# Patient Record
Sex: OTHER/UNKNOWN | URBAN - NONMETROPOLITAN AREA
[De-identification: names, ages, dates, MRNs, and addresses within clinical notes are randomized per-mention and may not be internally consistent; named-entity substitution may affect disease eponyms.]

---

## 2024-03-08 ENCOUNTER — TELEPHONE (OUTPATIENT)
Dept: CARDIOLOGY | Facility: CLINIC | Age: 69
End: 2024-03-08
Payer: MEDICARE

## 2024-03-08 ENCOUNTER — TRANSCRIBE ORDERS (OUTPATIENT)
Dept: CARDIOLOGY | Facility: CLINIC | Age: 69
End: 2024-03-08
Payer: MEDICARE

## 2024-03-08 DIAGNOSIS — I38 HEART VALVE CALCIFICATION: ICD-10-CM

## 2024-03-08 NOTE — TELEPHONE ENCOUNTER
SPOKE WITH PT, PT TO CALL BACK AND SCHEDULE REFERRAL, HUB CAN SCHEDULE EARLIEST AVAILABLE APPOINTMENT

## 2024-04-08 ENCOUNTER — OFFICE VISIT (OUTPATIENT)
Dept: CARDIOLOGY | Facility: CLINIC | Age: 69
End: 2024-04-08
Payer: MEDICARE

## 2024-04-08 VITALS
SYSTOLIC BLOOD PRESSURE: 128 MMHG | HEIGHT: 71 IN | DIASTOLIC BLOOD PRESSURE: 66 MMHG | HEART RATE: 102 BPM | RESPIRATION RATE: 16 BRPM | WEIGHT: 231 LBS | BODY MASS INDEX: 32.34 KG/M2 | OXYGEN SATURATION: 99 %

## 2024-04-08 DIAGNOSIS — I10 ESSENTIAL HYPERTENSION: ICD-10-CM

## 2024-04-08 DIAGNOSIS — E78.2 MIXED HYPERLIPIDEMIA: ICD-10-CM

## 2024-04-08 DIAGNOSIS — I25.10 CORONARY ARTERY CALCIFICATION SEEN ON CT SCAN: ICD-10-CM

## 2024-04-08 DIAGNOSIS — I51.7 LEFT ATRIAL ENLARGEMENT: ICD-10-CM

## 2024-04-08 PROCEDURE — 93000 ELECTROCARDIOGRAM COMPLETE: CPT | Performed by: INTERNAL MEDICINE

## 2024-04-08 PROCEDURE — 3078F DIAST BP <80 MM HG: CPT | Performed by: INTERNAL MEDICINE

## 2024-04-08 PROCEDURE — 1160F RVW MEDS BY RX/DR IN RCRD: CPT | Performed by: INTERNAL MEDICINE

## 2024-04-08 PROCEDURE — 99204 OFFICE O/P NEW MOD 45 MIN: CPT | Performed by: INTERNAL MEDICINE

## 2024-04-08 PROCEDURE — 1159F MED LIST DOCD IN RCRD: CPT | Performed by: INTERNAL MEDICINE

## 2024-04-08 PROCEDURE — 3074F SYST BP LT 130 MM HG: CPT | Performed by: INTERNAL MEDICINE

## 2024-04-08 RX ORDER — FENOFIBRATE 160 MG/1
1 TABLET ORAL DAILY
COMMUNITY
Start: 2024-01-22

## 2024-04-08 RX ORDER — PANTOPRAZOLE SODIUM 20 MG/1
20 TABLET, DELAYED RELEASE ORAL DAILY
COMMUNITY

## 2024-04-08 RX ORDER — PRAVASTATIN SODIUM 20 MG
40 TABLET ORAL
Status: SHIPPED | COMMUNITY
Start: 2024-04-08

## 2024-04-08 RX ORDER — PRAVASTATIN SODIUM 20 MG
20 TABLET ORAL
COMMUNITY
Start: 2024-01-22 | End: 2024-04-08

## 2024-04-08 RX ORDER — FERROUS SULFATE 325(65) MG
1 TABLET ORAL DAILY
COMMUNITY
Start: 2024-01-24

## 2024-04-08 RX ORDER — LISINOPRIL 40 MG/1
1 TABLET ORAL DAILY
COMMUNITY
Start: 2024-02-01

## 2024-04-08 RX ORDER — ORAL SEMAGLUTIDE 7 MG/1
1 TABLET ORAL DAILY
COMMUNITY
Start: 2024-03-08

## 2024-04-08 RX ORDER — AMLODIPINE BESYLATE 5 MG/1
1 TABLET ORAL DAILY
COMMUNITY
Start: 2024-01-23

## 2024-04-08 RX ORDER — NIACIN 500 MG/1
500 TABLET, EXTENDED RELEASE ORAL DAILY
COMMUNITY
Start: 2024-01-22

## 2024-04-08 RX ORDER — GLIMEPIRIDE 2 MG/1
2 TABLET ORAL
COMMUNITY
Start: 2024-02-09

## 2024-04-08 NOTE — ASSESSMENT & PLAN NOTE
Severe coronary calcification seen on CT scan of the chest.  Patient diabetic.  Blood pressure and heart rate to goal.  Cholesterol not to goal at 97 mg/dL.  No clinical angina.  Shortness of breath with moderate daily activities, possibly related to deconditioning versus CAD.  EKG normal.  Discussed CT findings with patient extensively and all questions answered.  Plan:  Refer patient for a Lexiscan nuclear stress test as unable to walk due to knee problems  Uptitrate pravastatin 40 mg p.o. daily, and continue fenofibrate/nicotinic acid as taken  Monitor blood pressure at home

## 2024-04-08 NOTE — PATIENT INSTRUCTIONS
[facility]  [facility] CARDIOLOGY  [address] [place] [address]  Dept: [phone]  Dept Fax: [phone]    Date:   Patient: Woo Acosta    Blood Pressure Log  Provided By: Gregoroi Lang MD    Date Blood Pressure Heart Rate Comments   Tuesday April 9, 2024       Wednesday April 10, 2024       Thursday April 11, 2024       Friday April 12, 2024       Saturday April 13, 2024       Thee April 14, 2024       Monday April 15, 2024       Tuesday April 16, 2024       Wednesday April 17, 2024       Thursday April 18, 2024       Friday April 19, 2024       Saturday April 20, 2024       Thee April 21, 2024       Monday April 22, 2024       Tuesday April 23, 2024       Wednesday April 24, 2024       Thursday April 25, 2024       Friday April 26, 2024       Saturday April 27, 2024       Thee April 28, 2024       Monday April 29, 2024       Tuesday April 30, 2024       Wednesday May 1, 2024       Thursday May 2, 2024       Friday May 3, 2024       Saturday May 4, 2024       Thee May 5, 2024       Monday May 6, 2024       Tuesday May 7, 2024       Wednesday May 8, 2024       Thursday May 9, 2024       Friday May 10, 2024       Saturday May 11, 2024       Thee May 12, 2024       Monday May 13, 2024       Tuesday May 14, 2024       Wednesday May 15, 2024       Thursday May 16, 2024       Friday May 17, 2024       Saturday May 18, 2024       Thee May 19, 2024       Monday May 20, 2024       Tuesday May 21, 2024       Wednesday May 22, 2024       Thursday May 23, 2024       Friday May 24, 2024       Saturday May 25, 2024       Thee May 26, 2024       Monday May 27, 2024       Tuesday May 28, 2024       Wednesday May 29, 2024       Thursday May 30, 2024       Friday May 31, 2024       Saturday June 1, 2024       Thee June 2, 2024       Monday Arianna 3, 2024       Tuesday June 4, 2024       Wednesday June 5, 2024       Thursday June 6, 2024

## 2024-04-08 NOTE — ASSESSMENT & PLAN NOTE
Lipid abnormalities are improving with treatment    Plan:  Continue same medication/s without change.   and Plan dosage change to the following medication/s;  uptitrate pravastatin to 40 mg p.o. daily.      Discussed medication dosage, use, side effects, and goals of treatment in detail.    Counseled patient on lifestyle modifications to help control hyperlipidemia.   Cholesterol lowering dietary information shared with patient.  Advised patient to exercise for 150 minutes weekly. (30 minute brisk walk, 5 days a week for example)  Weight Loss encouraged  Patient counseled in regards to heart healthy, low fat/ low cholesterol/low sat diet, daily exercise for 30 minutes, low to moderate intensity, and weight loss.    Patient Treatment Goals:   LDL goal is less than 70    Followup in 6 months.

## 2024-04-08 NOTE — PROGRESS NOTES
[facility]  [facility] CARDIOLOGY  [address] [place] [address]  Dept: [phone]  Dept Fax: [phone]    Date: 2024  Patient: Woo Acosta  YOB: 1955    New Patient Office Note    Consult Reason:  Mr. Woo Acosta is a 68 y.o. male who presents to the clinic to establish care, seen for Coronary Artery Disease (Severe coronary calcifications seen on lung cancer screening CT).   Patient overall doing well.  With limitation to exercise on moderate daily activity, and of a steep hill.  Patient denies angina, orthopnea, PND, palpitations, lightheadedness, syncope or medications side-effects.    The following portions of the patient's history were reviewed and updated as appropriate: allergies, current medications, past family history, past medical history, past social history, past surgical history, and problem list.    Medications: No Known Allergies   Current Outpatient Medications   Medication Instructions    amLODIPine (NORVASC) 5 MG tablet 1 tablet, Oral, Daily    fenofibrate 160 MG tablet 1 tablet, Oral, Daily    ferrous sulfate 325 (65 FE) MG tablet 1 tablet, Oral, Daily    glimepiride (AMARYL) 2 mg, Oral, Every Morning Before Breakfast    lisinopril (PRINIVIL,ZESTRIL) 40 MG tablet 1 tablet, Oral, Daily    metFORMIN (GLUCOPHAGE) 1,000 mg, Oral, 2 Times Daily With Meals    niacin (NIASPAN) 500 mg, Oral, Daily, with food    pantoprazole (PROTONIX) 20 mg, Oral, Daily    pravastatin (PRAVACHOL) 40 mg, Oral, Every Night at Bedtime    Rybelsus 7 MG tablet 1 tablet, Oral, Daily       Subjective  History reviewed. No pertinent past medical history.    History reviewed. No pertinent surgical history.    History reviewed. No pertinent family history.     Social History     Socioeconomic History    Marital status:    Tobacco Use    Smoking status: Former     Types: Cigarettes     Start date:      Quit date: 1976     Years since quittin.3  "   Smokeless tobacco: Never   Vaping Use    Vaping status: Never Used   Substance and Sexual Activity    Alcohol use: Not Currently     Comment: Once a year    Drug use: Never    Sexual activity: Defer       Objective  Vitals:    04/08/24 1401   BP: 128/66   BP Location: Right arm   Patient Position: Sitting   Cuff Size: Large Adult   Pulse: 102   Resp: 16   SpO2: 99%   Weight: 105 kg (231 lb)   Height: 180.3 cm (71\")   PainSc: 0-No pain     Vitals:    04/08/24 1401   BP: 128/66   BP Location: Right arm   Patient Position: Sitting   Cuff Size: Large Adult   Pulse: 102   Resp: 16   SpO2: 99%   Weight: 105 kg (231 lb)   Height: 180.3 cm (71\")        Physical Exam  Constitutional:       Appearance: Healthy appearance. Not in distress.   Eyes:      Pupils: Pupils are equal, round, and reactive to light.   HENT:    Mouth/Throat:      Mouth: Mucous membranes are moist.   Neck:      Vascular: No carotid bruit, hepatojugular reflux, JVD or JVR. JVD normal.   Pulmonary:      Effort: Pulmonary effort is normal.      Breath sounds: Normal breath sounds. No wheezing. No rhonchi. No rales.   Chest:      Chest wall: Not tender to palpatation.   Cardiovascular:      PMI at left midclavicular line. Normal rate. Regular rhythm. Normal S1 with normal intensity. Normal S2 with normal intensity.       Murmurs: There is no murmur.      No gallop.  No click. No rub.   Pulses:     Carotid: 4+ bilaterally.     Radial: 4+ bilaterally.     Popliteal: 4+ bilaterally.     Dorsalis pedis: 4+ bilaterally.  Edema:     Peripheral edema absent.   Abdominal:      General: There is no abdominal bruit.   Skin:     General: Skin is warm.   Neurological:      Mental Status: Alert and oriented to person, place and time.              Labs:  No results found for: \"NA\", \"K\", \"CL\", \"CO2\", \"MG\", \"BUN\", \"CREATININE\", \"CALCIUM\", \"BILITOT\", \"ALKPHOS\", \"ALT\", \"AST\", \"GLUCOSE\", \"ALBUMIN\", \"PROTEIN\"  No results found for: \"WBC\", \"HGB\", \"HCT\", \"PLT\"  No results " "found for: \"APTT\", \"INR\", \"PTT\"  No results found for: \"CKTOTAL\", \"TROPONINI\", \"TROPONINT\", \"CKMBINDEX\", \"BNP\"  No results found for: \"BNP\", \"PROBNP\"    No results found for: \"CHLPL\", \"TRIG\", \"HDL\", \"LDL\"  No results found for: \"TSH\", \"FREET4\"    The ASCVD Risk score (Joe MADDEN, et al., 2019) failed to calculate for the following reasons:    The patient has a prior MI or stroke diagnosis     [facility]:    ECG 12 Lead    Date/Time: 4/8/2024 2:25 PM  Performed by: Gregorio Lang MD    Authorized by: Gregorio Lang MD  Comparison: not compared with previous ECG   Previous ECG: no previous ECG available  Rhythm: sinus rhythm  Other findings: left atrial abnormality          PHYSICIAN ORDERS - SCAN - [facility] REFERRAL 3/8/24 [facility] (03/08/2024)     Assessment and Plan  Diagnoses and all orders for this visit:    1. Coronary artery calcification seen on CT scan (Primary)  Assessment & Plan:  Severe coronary calcification seen on CT scan of the chest.  Patient diabetic.  Blood pressure and heart rate to goal.  Cholesterol not to goal at 97 mg/dL.  No clinical angina.  Shortness of breath with moderate daily activities, possibly related to deconditioning versus CAD.  EKG normal.  Discussed CT findings with patient extensively and all questions answered.  Plan:  Refer patient for a Lexiscan nuclear stress test as unable to walk due to knee problems  Uptitrate pravastatin 40 mg p.o. daily, and continue fenofibrate/nicotinic acid as taken  Monitor blood pressure at home    Orders:  -     Stress Test With Myocardial Perfusion One Day; Future    2. Essential hypertension  Assessment & Plan:  Hypertension is stable and controlled  Continue current treatment regimen.  Dietary sodium restriction.  Weight loss.  Regular aerobic exercise.  Ambulatory blood pressure monitoring.  Refer patient for echocardiogram in view of dyspnea on exertion and left atrial enlargement on EKG.  Blood pressure will be reassessed " in 6 months.      3. Mixed hyperlipidemia  Assessment & Plan:   Lipid abnormalities are improving with treatment    Plan:  Continue same medication/s without change.   and Plan dosage change to the following medication/s;  uptitrate pravastatin to 40 mg p.o. daily.      Discussed medication dosage, use, side effects, and goals of treatment in detail.    Counseled patient on lifestyle modifications to help control hyperlipidemia.   Cholesterol lowering dietary information shared with patient.  Advised patient to exercise for 150 minutes weekly. (30 minute brisk walk, 5 days a week for example)  Weight Loss encouraged  Patient counseled in regards to heart healthy, low fat/ low cholesterol/low sat diet, daily exercise for 30 minutes, low to moderate intensity, and weight loss.    Patient Treatment Goals:   LDL goal is less than 70    Followup in 6 months.      4. Left atrial enlargement  -     Adult Transthoracic Echo Complete W/ Cont if Necessary Per Protocol; Future    Other orders  -     ECG 12 Lead         Return in about 6 months (around 10/8/2024) for Follow-up with Dr Lang, Next scheduled follow up.    Patient Instructions   [facility]  [facility] CARDIOLOGY  [address] [place] [address]  Dept: [phone]  Dept Fax: [phone]    Date:   Patient: Woo Acosta    Blood Pressure Log  Provided By: Gregorio Lang MD    Date Blood Pressure Heart Rate Comments   Tuesday April 9, 2024       Wednesday April 10, 2024       Thursday April 11, 2024       Friday April 12, 2024       Saturday April 13, 2024       Thee April 14, 2024       Monday April 15, 2024       Tuesday April 16, 2024       Wednesday April 17, 2024       Thursday April 18, 2024       Friday April 19, 2024       Saturday April 20, 2024       Thee April 21, 2024       Monday April 22, 2024       Tuesday April 23, 2024       Wednesday April 24, 2024       Thursday April 25, 2024       Friday April 26,  2024       Saturday April 27, 2024       Thee April 28, 2024       Monday April 29, 2024       Tuesday April 30, 2024       Wednesday May 1, 2024       Thursday May 2, 2024       Friday May 3, 2024       Saturday May 4, 2024       Thee May 5, 2024       Monday May 6, 2024       Tuesday May 7, 2024       Wednesday May 8, 2024       Thursday May 9, 2024       Friday May 10, 2024       Saturday May 11, 2024       Thee May 12, 2024       Monday May 13, 2024       Tuesday May 14, 2024       Wednesday May 15, 2024       Thursday May 16, 2024       Friday May 17, 2024       Saturday May 18, 2024       Thee May 19, 2024       Monday May 20, 2024       Tuesday May 21, 2024       Wednesday May 22, 2024       Thursday May 23, 2024       Friday May 24, 2024       Saturday May 25, 2024       Thee May 26, 2024       Monday May 27, 2024       Tuesday May 28, 2024       Wednesday May 29, 2024       Thursday May 30, 2024       Friday May 31, 2024       Saturday June 1, 2024       Thee June 2, 2024       Monday Arianna 3, 2024       Tuesday June 4, 2024       Wednesday June 5, 2024       Thursday June 6, 2024            Gregorio Lang MD

## 2024-04-08 NOTE — ASSESSMENT & PLAN NOTE
Hypertension is stable and controlled  Continue current treatment regimen.  Dietary sodium restriction.  Weight loss.  Regular aerobic exercise.  Ambulatory blood pressure monitoring.  Refer patient for echocardiogram in view of dyspnea on exertion and left atrial enlargement on EKG.  Blood pressure will be reassessed in 6 months.

## 2024-04-10 ENCOUNTER — TELEPHONE (OUTPATIENT)
Dept: CARDIOLOGY | Facility: CLINIC | Age: 69
End: 2024-04-10

## 2024-04-11 ENCOUNTER — TELEPHONE (OUTPATIENT)
Dept: CARDIOLOGY | Facility: CLINIC | Age: 69
End: 2024-04-11
Payer: MEDICARE

## 2024-04-11 NOTE — TELEPHONE ENCOUNTER
----- Message from Gregorio Lang MD sent at 4/10/2024  5:02 PM EDT -----  Please inform patient that his treadmill Nuclear Stress Test was normal.  Nonetheless patient has somewhat decreased exercise capacity as well as a hypertensive response during exercise suggesting suboptimally controlled hypertension, warranting aggressive medical therapy.  Thank you!

## 2024-04-26 ENCOUNTER — TELEPHONE (OUTPATIENT)
Dept: CARDIOLOGY | Facility: CLINIC | Age: 69
End: 2024-04-26
Payer: MEDICARE

## 2024-04-26 NOTE — TELEPHONE ENCOUNTER
----- Message from Donna MCLAUGHLIN sent at 4/25/2024 10:09 PM EDT -----  Please inform patient that his transthoracic echocardiogram was age-appropriate, with borderline thickening and stiffening likely from high blood pressure. Thank you!

## 2024-10-04 ENCOUNTER — OFFICE VISIT (OUTPATIENT)
Dept: CARDIOLOGY | Facility: CLINIC | Age: 69
End: 2024-10-04
Payer: MEDICARE

## 2024-10-04 VITALS
WEIGHT: 230.2 LBS | DIASTOLIC BLOOD PRESSURE: 64 MMHG | RESPIRATION RATE: 18 BRPM | OXYGEN SATURATION: 99 % | BODY MASS INDEX: 32.23 KG/M2 | HEART RATE: 78 BPM | SYSTOLIC BLOOD PRESSURE: 126 MMHG | HEIGHT: 71 IN

## 2024-10-04 DIAGNOSIS — I10 ESSENTIAL HYPERTENSION: ICD-10-CM

## 2024-10-04 DIAGNOSIS — I25.10 CORONARY ARTERY CALCIFICATION SEEN ON CT SCAN: Primary | ICD-10-CM

## 2024-10-04 DIAGNOSIS — E78.2 MIXED HYPERLIPIDEMIA: ICD-10-CM

## 2024-10-04 PROCEDURE — 3074F SYST BP LT 130 MM HG: CPT | Performed by: INTERNAL MEDICINE

## 2024-10-04 PROCEDURE — 1160F RVW MEDS BY RX/DR IN RCRD: CPT | Performed by: INTERNAL MEDICINE

## 2024-10-04 PROCEDURE — 1159F MED LIST DOCD IN RCRD: CPT | Performed by: INTERNAL MEDICINE

## 2024-10-04 PROCEDURE — 3078F DIAST BP <80 MM HG: CPT | Performed by: INTERNAL MEDICINE

## 2024-10-04 RX ORDER — ICOSAPENT ETHYL 1 G/1
2 CAPSULE ORAL 2 TIMES DAILY WITH MEALS
Qty: 120 CAPSULE | Refills: 11 | Status: SHIPPED | OUTPATIENT
Start: 2024-10-04

## 2024-10-04 RX ORDER — PRAVASTATIN SODIUM 40 MG
40 TABLET ORAL
Qty: 90 TABLET | Refills: 3 | Status: SHIPPED | OUTPATIENT
Start: 2024-10-04

## 2024-10-04 RX ORDER — FENOFIBRATE 200 MG/1
200 CAPSULE ORAL
Qty: 90 CAPSULE | Refills: 3 | Status: SHIPPED | OUTPATIENT
Start: 2024-10-04 | End: 2025-10-04

## 2024-10-04 NOTE — ASSESSMENT & PLAN NOTE
Lipid abnormalities are improving with treatment    Plan:  Continue same medication/s without change.   and Plan dosage change to the following medication/s;  fenofibrate micronized 200 mg p.o. daily.      Discussed medication dosage, use, side effects, and goals of treatment in detail.    Counseled patient on lifestyle modifications to help control hyperlipidemia.   Cholesterol lowering dietary information shared with patient.  Advised patient to exercise for 150 minutes weekly. (30 minute brisk walk, 5 days a week for example)  Weight Loss encouraged  Low triglycerides diet schedule patient.  Vascepa prescribed, unclear if patient able to afford.  Continue pravastatin 40 mg p.o. daily.  Change and uptitrate fenofibrate to 200 mg micronized daily.    Patient Treatment Goals:   LDL goal is less than 70  Triglyceride goal less than 200    Followup in 4 weeks with PCP, 6 months with cardiology.

## 2024-10-04 NOTE — PROGRESS NOTES
"[facility]  [facility] CARDIOLOGY  [address] [place] [address]  Dept: [phone]  Dept Fax: [phone]    Date: 10/04/2024  Patient: Woo Acosta  YOB: 1955    Follow Up Office Visit Note    Interval Follow-up  Mr. Woo Acosta is a 69 y.o. male who is here for follow-up on Coronary Artery Disease.    Subjective   Patient doing well with no complaints.  Patient denies angina, orthopnea, PND, palpitations, lightheadedness, syncope or medications side-effects.      The following portions of the patient's history were reviewed and updated as appropriate: allergies, current medications, past family history, past medical history, past social history, past surgical history, and problem list.    Medications: No Known Allergies   Current Outpatient Medications   Medication Instructions    amLODIPine (NORVASC) 5 MG tablet 1 tablet, Oral, Daily    fenofibrate micronized (LOFIBRA) 200 mg, Oral, Every Morning Before Breakfast    ferrous sulfate 325 (65 FE) MG tablet 1 tablet, Oral, Daily    glimepiride (AMARYL) 2 mg, Oral, Every Morning Before Breakfast    icosapent ethyl (VASCEPA) 2 g, Oral, 2 Times Daily With Meals    lisinopril (PRINIVIL,ZESTRIL) 40 MG tablet 1 tablet, Oral, Daily    metFORMIN (GLUCOPHAGE) 1,000 mg, Oral, 2 Times Daily With Meals    niacin (NIASPAN) 500 mg, Oral, Daily, with food    pantoprazole (PROTONIX) 40 mg, Oral, Daily    pravastatin (PRAVACHOL) 40 mg, Oral, Every Night at Bedtime    Rybelsus 7 MG tablet 1 tablet, Oral, Daily       Tobacco Use: Medium Risk (10/4/2024)    Patient History     Smoking Tobacco Use: Former     Smokeless Tobacco Use: Never     Passive Exposure: Not on file        Objective  Vitals:    10/04/24 1346   BP: 126/64   Pulse: 78   Resp: 18   SpO2: 99%   Weight: 104 kg (230 lb 3.2 oz)   Height: 180.3 cm (70.98\")   PainSc: 0-No pain      Vitals:    10/04/24 1346   BP: 126/64   Pulse: 78   Resp: 18   SpO2: 99% " "  Weight: 104 kg (230 lb 3.2 oz)   Height: 180.3 cm (70.98\")          Physical Exam  Constitutional:       Appearance: Healthy appearance. Not in distress.   Eyes:      Pupils: Pupils are equal, round, and reactive to light.   Neck:      Vascular: No JVR. JVD normal.   Pulmonary:      Effort: Pulmonary effort is normal.      Breath sounds: Normal breath sounds. No wheezing. No rhonchi. No rales.   Chest:      Chest wall: Not tender to palpatation.   Cardiovascular:      PMI at left midclavicular line. Normal rate. Regular rhythm. Normal S1 with normal intensity. Normal S2 with normal intensity.       Murmurs: There is no murmur.      No gallop.  No click. No rub.   Pulses:     Intact distal pulses.   Edema:     Peripheral edema absent.   Abdominal:      General: There is no abdominal bruit.   Skin:     General: Skin is warm.   Neurological:      Mental Status: Alert and oriented to person, place and time.              Diagnostic Data  No results found for: \"NA\", \"K\", \"CL\", \"CO2\", \"MG\", \"BUN\", \"CREATININE\", \"CALCIUM\", \"BILITOT\", \"ALKPHOS\", \"ALT\", \"AST\", \"GLUCOSE\", \"ALBUMIN\", \"PROTEIN\"  No results found for: \"WBC\", \"HGB\", \"HCT\", \"PLT\"  No results found for: \"APTT\", \"INR\", \"PTT\"  No results found for: \"CKTOTAL\", \"TROPONINI\", \"TROPONINT\", \"CKMBINDEX\", \"BNP\"  No results found for: \"BNP\", \"PROBNP\"    No results found for: \"CHLPL\", \"TRIG\", \"HDL\", \"LDL\"  No results found for: \"TSH\", \"FREET4\"    [facility]:    ECG 12 Lead    Date/Time: 10/4/2024 4:19 PM  Performed by: Gregorio Lang MD    Authorized by: Gregorio Lang MD  Comparison: compared with previous ECG from 4/4/2024  Comparison to previous ECG: Left atrial enlargement no longer identified on current EKG    Clinical impression: normal ECG  Comments: Normal sinus rhythm        CXR: No results found for this or any previous visit.     ECHO/MUGA: Results for orders placed in visit on 04/25/24    Adult Transthoracic Echo Complete W/ Cont if Necessary Per " Protocol    Interpretation Summary    Left ventricular systolic function is normal. Estimated left ventricular EF = 61% Left ventricular ejection fraction appears to be 61 - 65%.    Left ventricular wall thickness is consistent with borderline concentric hypertrophy.    Left ventricular diastolic function is consistent with (grade I) impaired relaxation.    Estimated right ventricular systolic pressure from tricuspid regurgitation is normal (<35 mmHg).     STRESS TESTS: Results for orders placed in visit on 04/10/24    Stress Test With Myocardial Perfusion One Day    Interpretation Summary    Impressions are consistent with a low risk/normal treadmill nuclear stress test.    Myocardial perfusion imaging indicates a normal myocardial perfusion study with no evidence of ischemia.    Left ventricular ejection fraction is hyperdynamic (Calculated EF > 70%).    Findings consistent with a normal ECG stress test, nonetheless moderate risk by Chu treadmill score due to 1 mm upsloping ST depression, and decreased exercise capacity 7 METS.  Of note also hypertensive response during exercise suggesting uncontrolled hypertension.     CARDIAC CATH: No results found for this or any previous visit.     DEVICES: No valid procedures specified.   HOLTER: No results found for this or any previous visit.     CT/MRI:  No results found for this or any previous visit.    VASCULAR: No valid procedures specified.     Assessment and Plan  Diagnoses and all orders for this visit:    1. Coronary artery calcification seen on CT scan (Primary)  Assessment & Plan:  evere coronary calcification seen on CT scan of the chest.  Patient diabetic.  Blood pressure and heart rate to goal.  Cholesterol not to goal on 20 mg pravastatin daily.  No clinical angina.  Shortness of breath with moderate daily activities, possibly related to deconditioning versus CAD.  EKG normal.  Stress test normal.  Transthoracic echocardiogram appropriate for age.   Plan:  Uptitrate pravastatin 40 mg p.o. daily as not done previously  Uptitrate fenofibrate to micronized 200 mg p.o. daily  Add Vascepa 2 g twice daily for high triglycerides  Continue nicotinic acid as taken  Monitor blood pressure at home  Patient counseled in regards to heart healthy, low fat/ low cholesterol/low sat diet, daily exercise for 30 minutes, low to moderate intensity, and weight loss.  Follow-up [facility] as per primary care provider    Orders:  -     ECG 12 Lead    2. Essential hypertension  Assessment & Plan:  Hypertension is stable and controlled  Continue current treatment regimen.  Dietary sodium restriction.  Weight loss.  Regular aerobic exercise.  Ambulatory blood pressure monitoring.  Blood pressure will be reassessed in 6 months.      3. Mixed hyperlipidemia  Assessment & Plan:   Lipid abnormalities are improving with treatment    Plan:  Continue same medication/s without change.   and Plan dosage change to the following medication/s;  fenofibrate micronized 200 mg p.o. daily.      Discussed medication dosage, use, side effects, and goals of treatment in detail.    Counseled patient on lifestyle modifications to help control hyperlipidemia.   Cholesterol lowering dietary information shared with patient.  Advised patient to exercise for 150 minutes weekly. (30 minute brisk walk, 5 days a week for example)  Weight Loss encouraged  Low triglycerides diet schedule patient.  Vascepa prescribed, unclear if patient able to afford.  Continue pravastatin 40 mg p.o. daily.  Change and uptitrate fenofibrate to 200 mg micronized daily.    Patient Treatment Goals:   LDL goal is less than 70  Triglyceride goal less than 200    Followup in 4 weeks with PCP, 6 months with cardiology.    Orders:  -     pravastatin (PRAVACHOL) 40 MG tablet; Take 1 tablet by mouth every night at bedtime.  Dispense: 90 tablet; Refill: 3  -     fenofibrate micronized (LOFIBRA) 200 MG capsule; Take 1 capsule by mouth Every Morning  Before Breakfast.  Dispense: 90 capsule; Refill: 3  -     icosapent ethyl (Vascepa) 1 g capsule capsule; Take 2 g by mouth 2 (Two) Times a Day With Meals.  Dispense: 120 capsule; Refill: 11         Return in about 6 months (around 4/4/2025) for Follow-up with Dr Lang.    There are no Patient Instructions on file for this visit.    Gregorio Lang MD

## 2024-10-04 NOTE — ASSESSMENT & PLAN NOTE
samuel coronary calcification seen on CT scan of the chest.  Patient diabetic.  Blood pressure and heart rate to goal.  Cholesterol not to goal on 20 mg pravastatin daily.  No clinical angina.  Shortness of breath with moderate daily activities, possibly related to deconditioning versus CAD.  EKG normal.  Stress test normal.  Transthoracic echocardiogram appropriate for age.  Plan:  Uptitrate pravastatin 40 mg p.o. daily as not done previously  Uptitrate fenofibrate to micronized 200 mg p.o. daily  Add Vascepa 2 g twice daily for high triglycerides  Continue nicotinic acid as taken  Monitor blood pressure at home  Patient counseled in regards to heart healthy, low fat/ low cholesterol/low sat diet, daily exercise for 30 minutes, low to moderate intensity, and weight loss.  Follow-up [facility] as per primary care provider

## 2025-04-04 ENCOUNTER — OFFICE VISIT (OUTPATIENT)
Dept: CARDIOLOGY | Facility: CLINIC | Age: 70
End: 2025-04-04
Payer: MEDICARE

## 2025-04-04 VITALS
DIASTOLIC BLOOD PRESSURE: 70 MMHG | RESPIRATION RATE: 18 BRPM | HEIGHT: 71 IN | SYSTOLIC BLOOD PRESSURE: 126 MMHG | BODY MASS INDEX: 33.21 KG/M2 | HEART RATE: 94 BPM | OXYGEN SATURATION: 99 % | WEIGHT: 237.2 LBS

## 2025-04-04 DIAGNOSIS — I10 ESSENTIAL HYPERTENSION: ICD-10-CM

## 2025-04-04 DIAGNOSIS — E78.2 MIXED HYPERLIPIDEMIA: ICD-10-CM

## 2025-04-04 DIAGNOSIS — I25.10 CORONARY ARTERY CALCIFICATION SEEN ON CT SCAN: Primary | ICD-10-CM

## 2025-04-04 PROCEDURE — 3074F SYST BP LT 130 MM HG: CPT | Performed by: INTERNAL MEDICINE

## 2025-04-04 PROCEDURE — 99214 OFFICE O/P EST MOD 30 MIN: CPT | Performed by: INTERNAL MEDICINE

## 2025-04-04 PROCEDURE — 3078F DIAST BP <80 MM HG: CPT | Performed by: INTERNAL MEDICINE

## 2025-04-04 PROCEDURE — 1160F RVW MEDS BY RX/DR IN RCRD: CPT | Performed by: INTERNAL MEDICINE

## 2025-04-04 PROCEDURE — 1159F MED LIST DOCD IN RCRD: CPT | Performed by: INTERNAL MEDICINE

## 2025-04-04 NOTE — PROGRESS NOTES
"[facility]  [facility] CARDIOLOGY  [address] [place] [address]  Dept: [phone]  Dept Fax: [phone]    Date: 04/04/2025  Patient: Woo Acosta  YOB: 1955    Follow Up Office Visit Note    Interval Follow-up  Mr. Woo Acosta is a 69 y.o. male who is here for follow-up on coronary artery calcification.    Subjective   Patient doing well with no complaints.  Patient denies angina, orthopnea, PND, palpitations, lightheadedness, syncope or medications side-effects.      The following portions of the patient's history were reviewed and updated as appropriate: allergies, current medications, past family history, past medical history, past social history, past surgical history, and problem list.    Medications: No Known Allergies   Current Outpatient Medications   Medication Instructions    amLODIPine (NORVASC) 5 MG tablet 1 tablet, Oral, Daily    fenofibrate micronized (LOFIBRA) 200 mg, Oral, Every Morning Before Breakfast    ferrous sulfate 325 (65 FE) MG tablet 1 tablet, Oral, Daily    glimepiride (AMARYL) 2 mg, Oral, Every Morning Before Breakfast    icosapent ethyl (VASCEPA) 2 g, Oral, 2 Times Daily With Meals    lisinopril (PRINIVIL,ZESTRIL) 40 MG tablet 1 tablet, Oral, Daily    metFORMIN (GLUCOPHAGE) 1,000 mg, Oral, 2 Times Daily With Meals    niacin (NIASPAN) 500 mg, Oral, Daily, with food    pantoprazole (PROTONIX) 40 mg, Oral, Daily    pravastatin (PRAVACHOL) 40 mg, Oral, Every Night at Bedtime    Rybelsus 7 MG tablet 1 tablet, Oral, Daily       Tobacco Use: Medium Risk (4/4/2025)    Patient History     Smoking Tobacco Use: Former     Smokeless Tobacco Use: Never     Passive Exposure: Not on file        Objective  Vitals:    04/04/25 1021   BP: 126/70   Pulse: 94   Resp: 18   SpO2: 99%   Weight: 108 kg (237 lb 3.2 oz)   Height: 180.3 cm (70.98\")   PainSc: 0-No pain      Vitals:    04/04/25 1021   BP: 126/70   Pulse: 94   Resp: 18   SpO2: 99% " "  Weight: 108 kg (237 lb 3.2 oz)   Height: 180.3 cm (70.98\")          Physical Exam  Constitutional:       Appearance: Healthy appearance. Not in distress.   Eyes:      Pupils: Pupils are equal, round, and reactive to light.   Neck:      Vascular: No JVR. JVD normal.   Pulmonary:      Effort: Pulmonary effort is normal.      Breath sounds: Normal breath sounds. No wheezing. No rhonchi. No rales.   Chest:      Chest wall: Not tender to palpatation.   Cardiovascular:      PMI at left midclavicular line. Normal rate. Regular rhythm. Normal S1 with normal intensity. Normal S2 with normal intensity.       Murmurs: There is no murmur.      No gallop.  No click. No rub.   Pulses:     Intact distal pulses.   Edema:     Peripheral edema absent.   Abdominal:      General: There is no abdominal bruit.   Skin:     General: Skin is warm.   Neurological:      Mental Status: Alert and oriented to person, place and time.              Diagnostic Data  No results found for: \"NA\", \"K\", \"CL\", \"CO2\", \"MG\", \"BUN\", \"CREATININE\", \"CALCIUM\", \"BILITOT\", \"ALKPHOS\", \"ALT\", \"AST\", \"GLUCOSE\", \"ALBUMIN\", \"PROTEIN\"  No results found for: \"WBC\", \"HGB\", \"HCT\", \"PLT\"  No results found for: \"APTT\", \"INR\", \"PTT\"  No results found for: \"CKTOTAL\", \"TROPONINI\", \"TROPONINT\", \"CKMBINDEX\", \"BNP\"  No results found for: \"BNP\", \"PROBNP\"    No results found for: \"CHLPL\", \"TRIG\", \"HDL\", \"LDL\"  No results found for: \"TSH\", \"FREET4\"    CV Diagnostics:  Procedures  CXR: No results found for this or any previous visit.     ECHO/MUGA: Results for orders placed in visit on 04/25/24    Adult Transthoracic Echo Complete W/ Cont if Necessary Per Protocol    Interpretation Summary    Left ventricular systolic function is normal. Estimated left ventricular EF = 61% Left ventricular ejection fraction appears to be 61 - 65%.    Left ventricular wall thickness is consistent with borderline concentric hypertrophy.    Left ventricular diastolic function is consistent with (grade " I) impaired relaxation.    Estimated right ventricular systolic pressure from tricuspid regurgitation is normal (<35 mmHg).     STRESS TESTS: Results for orders placed in visit on 04/10/24    Stress Test With Myocardial Perfusion One Day    Interpretation Summary    Impressions are consistent with a low risk/normal treadmill nuclear stress test.    Myocardial perfusion imaging indicates a normal myocardial perfusion study with no evidence of ischemia.    Left ventricular ejection fraction is hyperdynamic (Calculated EF > 70%).    Findings consistent with a normal ECG stress test, nonetheless moderate risk by Chu treadmill score due to 1 mm upsloping ST depression, and decreased exercise capacity 7 METS.  Of note also hypertensive response during exercise suggesting uncontrolled hypertension.     CARDIAC CATH: No results found for this or any previous visit.     DEVICES: No valid procedures specified.   HOLTER: No results found for this or any previous visit.     CT/MRI:  No results found for this or any previous visit.    VASCULAR: No valid procedures specified.     Assessment and Plan  Diagnoses and all orders for this visit:    1. Coronary artery calcification seen on CT scan (Primary)  Assessment & Plan:  Severe coronary calcification seen on CT scan of the chest.  Patient diabetic.  Blood pressure and heart rate to goal.  Profile pending recheck fasting labs.  No clinical angina.  Shortness of breath with moderate daily activities, possibly related to deconditioning versus CAD.  EKG normal.  Stress test normal.  Transthoracic echocardiogram appropriate for age.  Plan:  Continue optimal medical therapy   Awaiting repeat labs  Patient counseled in regards to heart healthy, low fat/ low cholesterol/low sat diet, daily exercise for 30 minutes, low to moderate intensity, and weight loss.      2. Essential hypertension  Assessment & Plan:  Hypertension is stable and controlled.  Continue current treatment  regimen.  Dietary sodium restriction.  Weight loss.  Regular aerobic exercise.  Ambulatory blood pressure monitoring.  Labs by next visit since on lisinopril, history of iron deficiency.  Blood pressure will be reassessed in 6 months.      3. Mixed hyperlipidemia  Assessment & Plan:   Lipid abnormalities are improving with treatment     Plan:  Continue same medication/s without change  Fenofibrate 20 mg daily and pravastatin 40 mg p.o nightly.       Discussed medication dosage, use, side effects, and goals of treatment in detail.    Counseled patient on lifestyle modifications to help control hyperlipidemia.   Cholesterol lowering dietary information shared with patient.  Advised patient to exercise for 150 minutes weekly. (30 minute brisk walk, 5 days a week for example)  Weight Loss encouraged  Low triglycerides diet schedule patient.  Vascepa prescribed, unclear if patient able to afford.  Continue pravastatin 40 mg p.o. daily.  Change and uptitrate fenofibrate to 200 mg micronized daily.    LABORATORY - SCAN - LABS [facility] 9/26/24 (09/26/2024)     Patient Treatment Goals:   LDL goal is less than 70  Triglyceride goal less than 200  Last labs not to goal.  Awaiting repeat lipid profile.    Followup labs in 2 months, cardiology 6 months.           Return in about 6 months (around 10/4/2025) for Follow-up with Dr Lang.    There are no Patient Instructions on file for this visit.    Gregorio Lang MD

## 2025-04-04 NOTE — ASSESSMENT & PLAN NOTE
Severe coronary calcification seen on CT scan of the chest.  Patient diabetic.  Blood pressure and heart rate to goal.  Profile pending recheck fasting labs.  No clinical angina.  Shortness of breath with moderate daily activities, possibly related to deconditioning versus CAD.  EKG normal.  Stress test normal.  Transthoracic echocardiogram appropriate for age.  Plan:  Continue optimal medical therapy   Awaiting repeat labs  Patient counseled in regards to heart healthy, low fat/ low cholesterol/low sat diet, daily exercise for 30 minutes, low to moderate intensity, and weight loss.

## 2025-04-04 NOTE — ASSESSMENT & PLAN NOTE
Lipid abnormalities are improving with treatment     Plan:  Continue same medication/s without change  Fenofibrate 20 mg daily and pravastatin 40 mg p.o nightly.       Discussed medication dosage, use, side effects, and goals of treatment in detail.    Counseled patient on lifestyle modifications to help control hyperlipidemia.   Cholesterol lowering dietary information shared with patient.  Advised patient to exercise for 150 minutes weekly. (30 minute brisk walk, 5 days a week for example)  Weight Loss encouraged  Low triglycerides diet schedule patient.  Vascepa prescribed, unclear if patient able to afford.  Continue pravastatin 40 mg p.o. daily.  Change and uptitrate fenofibrate to 200 mg micronized daily.    LABORATORY - SCAN - LABS [facility] 9/26/24 (09/26/2024)     Patient Treatment Goals:   LDL goal is less than 70  Triglyceride goal less than 200  Last labs not to goal.  Awaiting repeat lipid profile.    Followup labs in 2 months, cardiology 6 months.

## 2025-04-04 NOTE — ASSESSMENT & PLAN NOTE
Hypertension is stable and controlled.  Continue current treatment regimen.  Dietary sodium restriction.  Weight loss.  Regular aerobic exercise.  Ambulatory blood pressure monitoring.  Labs by next visit since on lisinopril, history of iron deficiency.  Blood pressure will be reassessed in 6 months.